# Patient Record
Sex: FEMALE | Race: ASIAN | NOT HISPANIC OR LATINO | URBAN - METROPOLITAN AREA
[De-identification: names, ages, dates, MRNs, and addresses within clinical notes are randomized per-mention and may not be internally consistent; named-entity substitution may affect disease eponyms.]

---

## 2017-06-22 ENCOUNTER — EMERGENCY (EMERGENCY)
Facility: HOSPITAL | Age: 47
LOS: 1 days | Discharge: HOME | End: 2017-06-22
Attending: EMERGENCY MEDICINE
Payer: MEDICAID

## 2017-06-22 VITALS
OXYGEN SATURATION: 98 % | DIASTOLIC BLOOD PRESSURE: 86 MMHG | HEART RATE: 72 BPM | WEIGHT: 119.93 LBS | RESPIRATION RATE: 16 BRPM | HEIGHT: 65 IN | SYSTOLIC BLOOD PRESSURE: 119 MMHG | TEMPERATURE: 98 F

## 2017-06-22 DIAGNOSIS — Z79.4 LONG TERM (CURRENT) USE OF INSULIN: ICD-10-CM

## 2017-06-22 DIAGNOSIS — E87.5 HYPERKALEMIA: ICD-10-CM

## 2017-06-22 DIAGNOSIS — E11.649 TYPE 2 DIABETES MELLITUS WITH HYPOGLYCEMIA WITHOUT COMA: ICD-10-CM

## 2017-06-22 PROCEDURE — 99285 EMERGENCY DEPT VISIT HI MDM: CPT | Mod: 25

## 2017-06-22 RX ORDER — DEXTROSE 50 % IN WATER 50 %
100 SYRINGE (ML) INTRAVENOUS ONCE
Qty: 0 | Refills: 0 | Status: COMPLETED | OUTPATIENT
Start: 2017-06-22 | End: 2017-06-22

## 2017-06-23 VITALS
TEMPERATURE: 98 F | HEART RATE: 78 BPM | OXYGEN SATURATION: 99 % | SYSTOLIC BLOOD PRESSURE: 121 MMHG | DIASTOLIC BLOOD PRESSURE: 81 MMHG

## 2017-06-23 LAB
ALBUMIN SERPL ELPH-MCNC: 3.1 G/DL — LOW (ref 3.5–5)
ALP SERPL-CCNC: 81 U/L — SIGNIFICANT CHANGE UP (ref 40–120)
ALT FLD-CCNC: 21 U/L DA — SIGNIFICANT CHANGE UP (ref 10–60)
ANION GAP SERPL CALC-SCNC: 8 MMOL/L — SIGNIFICANT CHANGE UP (ref 5–17)
APPEARANCE UR: CLEAR — SIGNIFICANT CHANGE UP
AST SERPL-CCNC: 66 U/L — HIGH (ref 10–40)
BILIRUB SERPL-MCNC: 0.4 MG/DL — SIGNIFICANT CHANGE UP (ref 0.2–1.2)
BILIRUB UR-MCNC: NEGATIVE — SIGNIFICANT CHANGE UP
BUN SERPL-MCNC: 24 MG/DL — HIGH (ref 7–18)
CALCIUM SERPL-MCNC: 8.1 MG/DL — LOW (ref 8.4–10.5)
CHLORIDE SERPL-SCNC: 111 MMOL/L — HIGH (ref 96–108)
CO2 SERPL-SCNC: 23 MMOL/L — SIGNIFICANT CHANGE UP (ref 22–31)
COLOR SPEC: YELLOW — SIGNIFICANT CHANGE UP
CREAT SERPL-MCNC: 0.9 MG/DL — SIGNIFICANT CHANGE UP (ref 0.5–1.3)
DIFF PNL FLD: NEGATIVE — SIGNIFICANT CHANGE UP
GLUCOSE SERPL-MCNC: 102 MG/DL — HIGH (ref 70–99)
GLUCOSE UR QL: 50 MG/DL
HCG UR QL: NEGATIVE — SIGNIFICANT CHANGE UP
HCT VFR BLD CALC: 36.1 % — SIGNIFICANT CHANGE UP (ref 34.5–45)
HGB BLD-MCNC: 11.7 G/DL — SIGNIFICANT CHANGE UP (ref 11.5–15.5)
KETONES UR-MCNC: NEGATIVE — SIGNIFICANT CHANGE UP
LEUKOCYTE ESTERASE UR-ACNC: NEGATIVE — SIGNIFICANT CHANGE UP
MCHC RBC-ENTMCNC: 29.5 PG — SIGNIFICANT CHANGE UP (ref 27–34)
MCHC RBC-ENTMCNC: 32.5 GM/DL — SIGNIFICANT CHANGE UP (ref 32–36)
MCV RBC AUTO: 91 FL — SIGNIFICANT CHANGE UP (ref 80–100)
NITRITE UR-MCNC: NEGATIVE — SIGNIFICANT CHANGE UP
PH UR: 7 — SIGNIFICANT CHANGE UP (ref 5–8)
PLATELET # BLD AUTO: 232 K/UL — SIGNIFICANT CHANGE UP (ref 150–400)
POTASSIUM SERPL-MCNC: 6.1 MMOL/L — HIGH (ref 3.5–5.3)
POTASSIUM SERPL-SCNC: 6.1 MMOL/L — HIGH (ref 3.5–5.3)
PROT SERPL-MCNC: 6.9 G/DL — SIGNIFICANT CHANGE UP (ref 6–8.3)
PROT UR-MCNC: NEGATIVE — SIGNIFICANT CHANGE UP
RBC # BLD: 3.97 M/UL — SIGNIFICANT CHANGE UP (ref 3.8–5.2)
RBC # FLD: 12.9 % — SIGNIFICANT CHANGE UP (ref 10.3–14.5)
SODIUM SERPL-SCNC: 142 MMOL/L — SIGNIFICANT CHANGE UP (ref 135–145)
SP GR SPEC: 1.01 — SIGNIFICANT CHANGE UP (ref 1.01–1.02)
UROBILINOGEN FLD QL: NEGATIVE — SIGNIFICANT CHANGE UP
WBC # BLD: 9.6 K/UL — SIGNIFICANT CHANGE UP (ref 3.8–10.5)
WBC # FLD AUTO: 9.6 K/UL — SIGNIFICANT CHANGE UP (ref 3.8–10.5)

## 2017-06-23 PROCEDURE — 71046 X-RAY EXAM CHEST 2 VIEWS: CPT

## 2017-06-23 PROCEDURE — 81003 URINALYSIS AUTO W/O SCOPE: CPT

## 2017-06-23 PROCEDURE — 85027 COMPLETE CBC AUTOMATED: CPT

## 2017-06-23 PROCEDURE — 71020: CPT | Mod: 26

## 2017-06-23 PROCEDURE — 93005 ELECTROCARDIOGRAM TRACING: CPT

## 2017-06-23 PROCEDURE — 99284 EMERGENCY DEPT VISIT MOD MDM: CPT | Mod: 25

## 2017-06-23 PROCEDURE — 96374 THER/PROPH/DIAG INJ IV PUSH: CPT

## 2017-06-23 PROCEDURE — 81025 URINE PREGNANCY TEST: CPT

## 2017-06-23 PROCEDURE — 80053 COMPREHEN METABOLIC PANEL: CPT

## 2017-06-23 RX ORDER — SODIUM CHLORIDE 9 MG/ML
1000 INJECTION INTRAMUSCULAR; INTRAVENOUS; SUBCUTANEOUS ONCE
Qty: 0 | Refills: 0 | Status: COMPLETED | OUTPATIENT
Start: 2017-06-23 | End: 2017-06-23

## 2017-06-23 RX ORDER — FUROSEMIDE 40 MG
20 TABLET ORAL ONCE
Qty: 0 | Refills: 0 | Status: COMPLETED | OUTPATIENT
Start: 2017-06-23 | End: 2017-06-23

## 2017-06-23 RX ORDER — SODIUM POLYSTYRENE SULFONATE 4.1 MEQ/G
30 POWDER, FOR SUSPENSION ORAL ONCE
Qty: 0 | Refills: 0 | Status: COMPLETED | OUTPATIENT
Start: 2017-06-23 | End: 2017-06-23

## 2017-06-23 RX ADMIN — SODIUM CHLORIDE 4000 MILLILITER(S): 9 INJECTION INTRAMUSCULAR; INTRAVENOUS; SUBCUTANEOUS at 01:47

## 2017-06-23 RX ADMIN — SODIUM CHLORIDE 4000 MILLILITER(S): 9 INJECTION INTRAMUSCULAR; INTRAVENOUS; SUBCUTANEOUS at 03:29

## 2017-06-23 RX ADMIN — Medication 20 MILLIGRAM(S): at 01:54

## 2017-06-23 RX ADMIN — SODIUM POLYSTYRENE SULFONATE 30 GRAM(S): 4.1 POWDER, FOR SUSPENSION ORAL at 01:48

## 2017-06-23 NOTE — ED PROVIDER NOTE - OBJECTIVE STATEMENT
47 y/o F pt with PMHx of DM (on insulin; taking 20 units), presents to ED c/o unresponsiveness x today. As per boyfriend at bedside, pt has low blood pressure; pt has been eating and drinking well. Boyfriend states pt fell in the car while on their way home. Upon arrival at home, boyfriend had a difficult time waking the pt up. Pt denies fever, chills, nausea, vomiting, diarrhea, abd pain, CP, palpitations, dizziness, HA, shortness of breath, cough, or any other complaints. NKDA. 47 y/o F pt with PMHx of DM (on insulin; taking 20 units of 70/30 combination), presents to ED c/o unresponsiveness x today. As per boyfriend at bedside, pt has low blood pressure; pt has been eating and drinking well. Boyfriend states pt fell in the car while on their way home. Upon arrival at home, boyfriend had a difficult time waking the pt up, activated EMS, FS was 20. Pt denies fever, chills, nausea, vomiting, diarrhea, abd pain, CP, palpitations, dizziness, HA, shortness of breath, cough, or any other complaints. NKDA. no recent changes in meds.

## 2017-06-23 NOTE — ED PROVIDER NOTE - MEDICAL DECISION MAKING DETAILS
hypoglycemia, improved with dextrose, pt took too much insulin. cr wnl. also found to have hyperk to 6.1, partially hemolyzed. treated. pt feels well. eating in the ED. discussed anticipatory guidance. gave copy of results. plans to follow up with pmd tomorrow.

## 2017-06-23 NOTE — ED ADULT NURSE NOTE - OBJECTIVE STATEMENT
pt. is aox3 came to er via ems was found unresponsive fs by ems was 20 D50 given by ems now 150mg/dl pt is still evasive with her asnwers. was seen by attending. labs sent. iv placed by ems to left forearm 20 g. x-ray done urine pending

## 2017-11-27 ENCOUNTER — EMERGENCY (EMERGENCY)
Facility: HOSPITAL | Age: 47
LOS: 0 days | Discharge: HOME | End: 2017-11-27
Attending: EMERGENCY MEDICINE | Admitting: EMERGENCY MEDICINE
Payer: MEDICAID

## 2017-11-27 VITALS
HEART RATE: 79 BPM | OXYGEN SATURATION: 100 % | RESPIRATION RATE: 16 BRPM | SYSTOLIC BLOOD PRESSURE: 132 MMHG | TEMPERATURE: 98 F

## 2017-11-27 VITALS
HEART RATE: 54 BPM | SYSTOLIC BLOOD PRESSURE: 145 MMHG | WEIGHT: 130.07 LBS | OXYGEN SATURATION: 100 % | TEMPERATURE: 98 F | HEIGHT: 62 IN | RESPIRATION RATE: 16 BRPM | DIASTOLIC BLOOD PRESSURE: 75 MMHG

## 2017-11-27 DIAGNOSIS — E10.649 TYPE 1 DIABETES MELLITUS WITH HYPOGLYCEMIA WITHOUT COMA: ICD-10-CM

## 2017-11-27 LAB — GLUCOSE BLDC GLUCOMTR-MCNC: 167 MG/DL — HIGH (ref 70–99)

## 2017-11-27 PROCEDURE — 99285 EMERGENCY DEPT VISIT HI MDM: CPT

## 2017-11-27 NOTE — ED PROVIDER NOTE - OBJECTIVE STATEMENT
46 y/o female with h/o type I DM taking Novolog 70/30 BIBEMS for BGM of 25 at home which increased to above 200 with 1 Amp of D50 given on the scene.  Pt took her insulin last at 2200 last night and ate a small meal.  Pt's significant other states that she kicked him out of the bed and started singing early in the morning.  He then noted that she was unresponsive about 1 hour ago and called EMS.  Pt alert, oriented and denies complaints at this time.

## 2017-11-27 NOTE — ED PROVIDER NOTE - MEDICAL DECISION MAKING DETAILS
Pt's glucose maintained after oral intake.  Pt not on any long acting oral medications and has normal exam presently.  Pt needs follow up with a local endocrinologist and PCP.

## 2017-11-27 NOTE — ED ADULT NURSE NOTE - OBJECTIVE STATEMENT
patient takes insulin 30/70, she states that she was moving and was working more than usual and probably eating less. Patient injected insulin at 1030 pm last night, f/b dinner. significant other states patient was restless throughout the night, which is usual sign that blood sugar is dropping.  This morning she was unresponsive, he checked sugar and it was 36, he called 911.  blood sugar on scene was 25 repeat after dextrose IV was 220.

## 2017-11-27 NOTE — ED ADULT NURSE NOTE - CHIEF COMPLAINT QUOTE
Patient was lethargic at home. On EMS arrival BGM was 25. 1 amp d50 given by EMS. Repeat  by EMS. Patient currently awake and alert. No complaints.

## 2018-01-01 ENCOUNTER — OUTPATIENT (OUTPATIENT)
Dept: OUTPATIENT SERVICES | Facility: HOSPITAL | Age: 48
LOS: 1 days | End: 2018-01-01
Payer: MEDICAID

## 2018-01-01 PROCEDURE — G9001: CPT

## 2018-01-05 ENCOUNTER — EMERGENCY (EMERGENCY)
Facility: HOSPITAL | Age: 48
LOS: 0 days | Discharge: HOME | End: 2018-01-05
Attending: EMERGENCY MEDICINE | Admitting: EMERGENCY MEDICINE
Payer: MEDICAID

## 2018-01-05 VITALS
RESPIRATION RATE: 17 BRPM | OXYGEN SATURATION: 100 % | TEMPERATURE: 99 F | HEART RATE: 82 BPM | SYSTOLIC BLOOD PRESSURE: 115 MMHG | DIASTOLIC BLOOD PRESSURE: 54 MMHG

## 2018-01-05 VITALS
DIASTOLIC BLOOD PRESSURE: 81 MMHG | WEIGHT: 108.91 LBS | SYSTOLIC BLOOD PRESSURE: 108 MMHG | OXYGEN SATURATION: 100 % | HEART RATE: 72 BPM | HEIGHT: 64 IN | RESPIRATION RATE: 18 BRPM

## 2018-01-05 DIAGNOSIS — E10.649 TYPE 1 DIABETES MELLITUS WITH HYPOGLYCEMIA WITHOUT COMA: ICD-10-CM

## 2018-01-05 DIAGNOSIS — R69 ILLNESS, UNSPECIFIED: ICD-10-CM

## 2018-01-05 DIAGNOSIS — N39.0 URINARY TRACT INFECTION, SITE NOT SPECIFIED: ICD-10-CM

## 2018-01-05 LAB
APPEARANCE UR: CLEAR — SIGNIFICANT CHANGE UP
BACTERIA # UR AUTO: (no result)
BILIRUB UR-MCNC: NEGATIVE — SIGNIFICANT CHANGE UP
COLOR SPEC: YELLOW — SIGNIFICANT CHANGE UP
COMMENT - URINE: SIGNIFICANT CHANGE UP
DIFF PNL FLD: NEGATIVE — SIGNIFICANT CHANGE UP
EPI CELLS # UR: (no result)
GLUCOSE BLDC GLUCOMTR-MCNC: 275 MG/DL — HIGH (ref 70–99)
GLUCOSE UR QL: NEGATIVE MG/DL — SIGNIFICANT CHANGE UP
KETONES UR-MCNC: NEGATIVE — SIGNIFICANT CHANGE UP
LEUKOCYTE ESTERASE UR-ACNC: (no result)
NITRITE UR-MCNC: NEGATIVE — SIGNIFICANT CHANGE UP
PH UR: 8 — SIGNIFICANT CHANGE UP (ref 5–8)
PROT UR-MCNC: NEGATIVE MG/DL — SIGNIFICANT CHANGE UP
RBC CASTS # UR COMP ASSIST: SIGNIFICANT CHANGE UP /HPF (ref 0–4)
SP GR SPEC: 1.01 — SIGNIFICANT CHANGE UP (ref 1.01–1.02)
UROBILINOGEN FLD QL: NEGATIVE MG/DL — SIGNIFICANT CHANGE UP
WBC UR QL: (no result)

## 2018-01-05 PROCEDURE — 99284 EMERGENCY DEPT VISIT MOD MDM: CPT

## 2018-01-05 RX ORDER — NITROFURANTOIN MACROCRYSTAL 50 MG
100 CAPSULE ORAL ONCE
Qty: 0 | Refills: 0 | Status: COMPLETED | OUTPATIENT
Start: 2018-01-05 | End: 2018-01-05

## 2018-01-05 RX ORDER — NITROFURANTOIN MACROCRYSTAL 50 MG
1 CAPSULE ORAL
Qty: 12 | Refills: 0 | OUTPATIENT
Start: 2018-01-05 | End: 2018-01-07

## 2018-01-05 RX ORDER — NITROFURANTOIN MACROCRYSTAL 50 MG
1 CAPSULE ORAL
Qty: 20 | Refills: 0 | OUTPATIENT
Start: 2018-01-05 | End: 2018-01-14

## 2018-01-05 RX ORDER — SODIUM CHLORIDE 9 MG/ML
1000 INJECTION INTRAMUSCULAR; INTRAVENOUS; SUBCUTANEOUS
Qty: 0 | Refills: 0 | Status: DISCONTINUED | OUTPATIENT
Start: 2018-01-05 | End: 2018-01-05

## 2018-01-05 RX ORDER — SODIUM CHLORIDE 9 MG/ML
3 INJECTION INTRAMUSCULAR; INTRAVENOUS; SUBCUTANEOUS ONCE
Qty: 0 | Refills: 0 | Status: COMPLETED | OUTPATIENT
Start: 2018-01-05 | End: 2018-01-05

## 2018-01-05 RX ADMIN — Medication 100 MILLIGRAM(S): at 07:06

## 2018-01-05 NOTE — ED PROVIDER NOTE - CARE PLAN
Principal Discharge DX:	Hypoglycemia  Secondary Diagnosis:	Urinary tract infection without hematuria, site unspecified

## 2018-01-05 NOTE — ED ADULT NURSE NOTE - CHPI ED SYMPTOMS NEG
no nausea/no chills/no fever/no pain/no decreased eating/drinking/no weakness/no dizziness/no tingling/no numbness/no vomiting

## 2018-01-05 NOTE — ED ADULT NURSE REASSESSMENT NOTE - NS ED NURSE REASSESS COMMENT FT1
Patient refusing IV and blood work. States she wants to drink orange juice and go home. MD Vaughn made aware. Drinking orange juice at this time. Will recheck BGM at 0700.
Report given to Meme DAVE

## 2018-01-05 NOTE — ED ADULT NURSE NOTE - OBJECTIVE STATEMENT
States sugar was low at home, no complaints at this time. No distress noted. Requesting to drink OJ and go home.

## 2018-01-05 NOTE — ED PROVIDER NOTE - OBJECTIVE STATEMENT
48 y/o female in ED c/o hypoglycemia this AM.  per spouse, pt with FS of 30 this morning.  pt given dextrose with repeat FS of 59.  pt denies any complaints.  spouse states pt was altered this AM now resolved.  states positive previous episodes.  no sick contacts or recent travel.   pt deneis any fever, HA, cp, sob, n/v/d/abd pain.

## 2018-01-31 ENCOUNTER — EMERGENCY (EMERGENCY)
Facility: HOSPITAL | Age: 48
LOS: 0 days | Discharge: HOME | End: 2018-01-31
Attending: EMERGENCY MEDICINE | Admitting: EMERGENCY MEDICINE
Payer: MEDICAID

## 2018-01-31 VITALS
HEART RATE: 73 BPM | TEMPERATURE: 98 F | RESPIRATION RATE: 14 BRPM | DIASTOLIC BLOOD PRESSURE: 50 MMHG | OXYGEN SATURATION: 97 % | SYSTOLIC BLOOD PRESSURE: 121 MMHG

## 2018-01-31 VITALS
HEART RATE: 51 BPM | WEIGHT: 100.09 LBS | SYSTOLIC BLOOD PRESSURE: 121 MMHG | TEMPERATURE: 98 F | RESPIRATION RATE: 16 BRPM | HEIGHT: 62 IN | DIASTOLIC BLOOD PRESSURE: 62 MMHG | OXYGEN SATURATION: 100 %

## 2018-01-31 DIAGNOSIS — E10.649 TYPE 1 DIABETES MELLITUS WITH HYPOGLYCEMIA WITHOUT COMA: ICD-10-CM

## 2018-01-31 DIAGNOSIS — Z79.4 LONG TERM (CURRENT) USE OF INSULIN: ICD-10-CM

## 2018-01-31 LAB — GLUCOSE BLDC GLUCOMTR-MCNC: 237 MG/DL — HIGH (ref 70–99)

## 2018-01-31 PROCEDURE — 99283 EMERGENCY DEPT VISIT LOW MDM: CPT | Mod: 25

## 2018-01-31 NOTE — ED PROVIDER NOTE - OBJECTIVE STATEMENT
48 yo F hx of Type I DM, presents via EMS with hypoglycemia.  Pt took 31 U of novolog last night and was unresponsive this morning.  EMS called Glucose was 20.  Given dextrose by EMS, repeat sugar here 150's.  Pt now states she feels fine.  States she normally takes 25 U, however blood sugar was in the 300's and felt like she needed more.  Denies any other symptoms, or recent illnesses.

## 2018-01-31 NOTE — ED ADULT TRIAGE NOTE - CHIEF COMPLAINT QUOTE
Patient was unconscious at home BGM was 25 on ems arrival. 1mg glucagon and 1/2 amp d50 given by ems. Patient currently awake and alert.  at triage.

## 2018-01-31 NOTE — ED ADULT NURSE NOTE - OBJECTIVE STATEMENT
pt BIBA s/p hypoglycemia/unconsciousness, as per EMS BGM in 20s. pt received 1 amp of D50 and glucagon en route. pt alert and oriented on arrival with blood glucose of 157 at triage. as per pt boyfriend, pt does not want blood work, IV. pt BIBA s/p hypoglycemia/unconsciousness, as per EMS BGM in 20s. pt received 1/2 amp of D50 and 1mg glucagon en route. pt alert and oriented on arrival with blood glucose of 157 at triage. as per pt boyfriend, pt does not want blood work, IV.

## 2018-04-10 ENCOUNTER — EMERGENCY (EMERGENCY)
Facility: HOSPITAL | Age: 48
LOS: 0 days | Discharge: LEFT BEFORE DISCHARGE | End: 2018-04-10
Attending: EMERGENCY MEDICINE | Admitting: EMERGENCY MEDICINE
Payer: MEDICAID

## 2018-04-10 VITALS
TEMPERATURE: 98 F | HEART RATE: 100 BPM | OXYGEN SATURATION: 100 % | WEIGHT: 125 LBS | RESPIRATION RATE: 16 BRPM | HEIGHT: 62 IN | SYSTOLIC BLOOD PRESSURE: 134 MMHG | DIASTOLIC BLOOD PRESSURE: 78 MMHG

## 2018-04-10 DIAGNOSIS — E11.649 TYPE 2 DIABETES MELLITUS WITH HYPOGLYCEMIA WITHOUT COMA: ICD-10-CM

## 2018-04-10 DIAGNOSIS — R41.82 ALTERED MENTAL STATUS, UNSPECIFIED: ICD-10-CM

## 2018-04-10 PROCEDURE — 99283 EMERGENCY DEPT VISIT LOW MDM: CPT

## 2018-04-10 NOTE — ED ADULT NURSE NOTE - CHIEF COMPLAINT QUOTE
Hypoglycemic at home. 1amp d50 and oral glucagon given by EMS. Patient awake and alert.  at triage. Frequently seen in ED for same.

## 2018-04-10 NOTE — ED ADULT NURSE NOTE - EXPLANATION OF PATIENT'S REASON FOR LEAVING
does not want to stay and be treated. Pt refused any and all treatment.  upon arrival. no further workup performed.

## 2018-04-10 NOTE — ED ADULT TRIAGE NOTE - CHIEF COMPLAINT QUOTE
Hypoglycemic at home. 1amp d50 given by EMS. Patient awake and alert. BGM at triage. Frequently seen in ED for same. Hypoglycemic at home. 1amp d50 given by EMS. Patient awake and alert.  at triage. Frequently seen in ED for same. Hypoglycemic at home. 1amp d50 and oral glucagon given by EMS. Patient awake and alert.  at triage. Frequently seen in ED for same.

## 2018-04-10 NOTE — ED PROVIDER NOTE - OBJECTIVE STATEMENT
48 y/o female in ED after found with AMS and low BS x 1 hr.  pt given D50 with resolution of AMS>  pt with no complaints.

## 2018-04-10 NOTE — ED PROVIDER NOTE - PROGRESS NOTE DETAILS
pt with h/o hypoglycemia resolved after given amp D50.  pt now A&Ox3.  refusing anything.  pt and spouse states will sign AMA and wants to leave now.

## 2019-02-13 NOTE — ED PROVIDER NOTE - CROS ED ROS STATEMENT
Please call.  Hepatitis C test is negative.  Triglycerides, a type of fat found in the bloodstream is high.  Other lipids are normal. all other ROS negative except as per HPI

## 2022-08-12 NOTE — ED ADULT TRIAGE NOTE - CHIEF COMPLAINT QUOTE
Patient was lethargic at home. On EMS arrival BGM was 25. 1 amp d50 given by EMS. Repeat  by EMS. Patient currently awake and alert. No complaints. Spoke with the office. They understood and agreed to call us back if needed.

## 2023-01-25 NOTE — ED PROVIDER NOTE - CONDUCTED A DETAILED DISCUSSION WITH PATIENT AND/OR GUARDIAN REGARDING, MDM
Pt appt canceled today, due to inclement weather (Annual Physical). She has a form needing signed, stating she has a PCP. She must turn the form in by Tuesday, 01/31/23. Pt has had 1 appt. New patient, on 7/28/21. Is it okay to schedule a Physical on Friday (01/27/23) at 11:45? Or do you want to sign form, and re-schedule Physical further out? radiology results/lab results/return to ED if symptoms worsen, persist or questions arise/need for outpatient follow-up

## 2023-08-04 NOTE — ED PROVIDER NOTE - CHPI ED SYMPTOMS NEG
no chills, no diarrhea, no abd pain, no CP, no palpitations, no SOB, no cough, no HA/no nausea/no fever/no dizziness/no vomiting Glycopyrrolate Pregnancy And Lactation Text: This medication is Pregnancy Category B and is considered safe during pregnancy. It is unknown if it is excreted breast milk.